# Patient Record
Sex: MALE | Race: WHITE
[De-identification: names, ages, dates, MRNs, and addresses within clinical notes are randomized per-mention and may not be internally consistent; named-entity substitution may affect disease eponyms.]

---

## 2020-01-22 ENCOUNTER — HOSPITAL ENCOUNTER (EMERGENCY)
Dept: HOSPITAL 74 - JERFT | Age: 25
Discharge: HOME | End: 2020-01-22
Payer: COMMERCIAL

## 2020-01-22 VITALS — DIASTOLIC BLOOD PRESSURE: 47 MMHG | TEMPERATURE: 98 F | HEART RATE: 52 BPM | SYSTOLIC BLOOD PRESSURE: 114 MMHG

## 2020-01-22 VITALS — BODY MASS INDEX: 23 KG/M2

## 2020-01-22 DIAGNOSIS — M25.561: Primary | ICD-10-CM

## 2020-01-22 PROCEDURE — 3E0233Z INTRODUCTION OF ANTI-INFLAMMATORY INTO MUSCLE, PERCUTANEOUS APPROACH: ICD-10-PCS

## 2020-01-22 NOTE — PDOC
History of Present Illness





- General


Chief Complaint: Pain


Stated Complaint: RT. KNEE PAIN


Time Seen by Provider: 01/22/20 11:07


History Source: Patient


Exam Limitations: No Limitations





- History of Present Illness


Initial Comments: 





01/22/20 13:44


24-year-old Pashto-speaking male denies past medical history presents 

complaining of right knee pain for approximately 2 weeks worsening yesterday.  

Patient was at work and climbed up a tower then felt worsening pain as he came 

down from the tower.  Denies direct knee injury, giving out of the knee, fever, 

chills, calf pain or any other complaint.  Took ibuprofen 200 mg last night for 

pain.





ROS:


GENERAL/CONSTITUTIONAL: No fever, chills, weakness, dizziness


HEAD, EYES, EARS, NOSE AND THROAT: No changes in vision, No ear pain or 

discharge, No sore throat


CARDIOVASCULAR: No chest pain


RESPIRATORY: No shortness of breath or cough


GASTROINTESTINAL: No pain, nausea, vomiting, diarrhea or constipation


GENITOURINARY: No dysuria


MUSCULOSKELETAL: Right knee pain, no neck or back pain


SKIN: No rash


NEUROLOGIC: No headache, vertigo, loss of consciousness, or loss of sensation





PE:


GENERAL: well-appearing, NAD


HEAD: NCAT


EYES: Pupils equal, round and reactive to light, sclera anicteric, conjunctiva 

clear


ENT: pharynx: no erythema, no exudate, uvula midline


NECK: supple


CHEST: nontender


RESP: clear, no w/r/r


CARDIO: rrr, no m/g/r


ABD: +BS, soft, nontender, non distended


BACK: no midline spinal ttp, no CVAT 


EXTREMITIES: Right knee - no bony tenderness, no swelling noted, no pain behind 

the knee, no crepitus, no laxity, normal range of motion, no edema


NEUROLOGICAL: Normal speech, normal gait


SKIN: Warm, Dry


Is this a multiple visit Asthma Patient?: No





Past History





- Past Medical History


Allergies/Adverse Reactions: 


 Allergies











Allergy/AdvReac Type Severity Reaction Status Date / Time


 


No Known Allergies Allergy   Verified 01/22/20 10:13














- Psycho Social/Smoking Cessation Hx


Smoking History: Never smoked


Have you smoked in the past 12 months: No


Hx Alcohol Use: No


Drug/Substance Use Hx: No





*Physical Exam





- Vital Signs


 Last Vital Signs











Temp Pulse Resp BP Pulse Ox


 


 98 F   52 L  18   114/47 L  100 


 


 01/22/20 10:13  01/22/20 10:13  01/22/20 10:13  01/22/20 10:13  01/22/20 10:13














ED Treatment Course





- RADIOLOGY


Radiology Studies Ordered: 














 Category Date Time Status


 


 KNEE 3 POS-RIGHT [RAD] Stat Radiology  01/22/20 11:44 Taken














- Medications


Given in the ED: 


ED Medications














Discontinued Medications














Generic Name Dose Route Start Last Admin





  Trade Name Marquita  PRN Reason Stop Dose Admin


 


Ketorolac Tromethamine  30 mg  01/22/20 11:44  01/22/20 11:51





  Toradol Injection -  IM  01/22/20 11:45  30 mg





  ONCE ONE   Administration





     





     





     





     














Medical Decision Making





- Medical Decision Making





01/22/20 13:46


24-year-old male without any past medical history presenting planing of right 

knee pain for 2 to 3 weeks worsening yesterday.  Denies direct trauma or any 

other injuries.





Right knee x-ray normal


Toradol 30 mg IM x1


Stable for discharge


Advised follow-up with orthopedics Dr. Conner Queen [phone]


Ace wrap applied to right knee





01/22/20 13:58








Discharge





- Discharge Information


Problems reviewed: Yes


Clinical Impression/Diagnosis: 


Knee pain


Qualifiers:


 Chronicity: acute Laterality: right Qualified Code(s): M25.561 - Pain in right 

knee





Condition: Stable


Disposition: HOME





- Admission


No





- Follow up/Referral


Referrals: 


Conner Queen DO [Staff Physician] - 





- Patient Discharge Instructions


Additional Instructions: 


Take ibuprofen 600 mg every 6 hours as needed for pain


Follow-up with Conner Johnson [phone] orthopedics within 3 to 5 days


Note for work provided


Return precautions discussed





- Post Discharge Activity


Work/Back to School Note:  Back to Work